# Patient Record
Sex: FEMALE | Race: WHITE
[De-identification: names, ages, dates, MRNs, and addresses within clinical notes are randomized per-mention and may not be internally consistent; named-entity substitution may affect disease eponyms.]

---

## 2019-11-25 ENCOUNTER — RX ONLY (RX ONLY)
Age: 49
End: 2019-11-25

## 2019-11-25 ENCOUNTER — DOCTOR'S OFFICE (OUTPATIENT)
Dept: URBAN - METROPOLITAN AREA CLINIC 136 | Facility: CLINIC | Age: 49
Setting detail: OPHTHALMOLOGY
End: 2019-11-25
Payer: MEDICAID

## 2019-11-25 DIAGNOSIS — H02.64: ICD-10-CM

## 2019-11-25 DIAGNOSIS — H02.62: ICD-10-CM

## 2019-11-25 DIAGNOSIS — H02.834: ICD-10-CM

## 2019-11-25 DIAGNOSIS — H02.65: ICD-10-CM

## 2019-11-25 DIAGNOSIS — H02.61: ICD-10-CM

## 2019-11-25 DIAGNOSIS — H02.831: ICD-10-CM

## 2019-11-25 DIAGNOSIS — H25.13: ICD-10-CM

## 2019-11-25 DIAGNOSIS — H02.433: ICD-10-CM

## 2019-11-25 PROCEDURE — 92285 EXTERNAL OCULAR PHOTOGRAPHY: CPT | Performed by: OPHTHALMOLOGY

## 2019-11-25 PROCEDURE — 99203 OFFICE O/P NEW LOW 30 MIN: CPT | Performed by: OPHTHALMOLOGY

## 2019-11-25 ASSESSMENT — LID POSITION - DERMATOCHALASIS
OS_DERMATOCHALASIS: LUL 2+
OD_DERMATOCHALASIS: RUL 1+

## 2019-11-25 ASSESSMENT — REFRACTION_CURRENTRX
OS_OVR_VA: 20/
OS_OVR_VA: 20/
OD_OVR_VA: 20/
OS_OVR_VA: 20/

## 2019-11-25 ASSESSMENT — SPHEQUIV_DERIVED
OD_SPHEQUIV: 0.875
OS_SPHEQUIV: 0.375

## 2019-11-25 ASSESSMENT — REFRACTION_MANIFEST
OS_VA3: 20/
OD_VA3: 20/
OS_VA1: 20/
OU_VA: 20/
OD_VA1: 20/
OD_VA2: 20/
OD_VA1: 20/
OS_VA2: 20/
OU_VA: 20/
OS_VA3: 20/
OS_VA1: 20/
OS_VA2: 20/
OD_VA2: 20/
OD_VA3: 20/

## 2019-11-25 ASSESSMENT — REFRACTION_AUTOREFRACTION
OS_CYLINDER: -1.75
OS_SPHERE: +1.25
OD_AXIS: 024
OD_SPHERE: +1.50
OS_AXIS: 163
OD_CYLINDER: -1.25

## 2019-11-25 ASSESSMENT — LID POSITION - PTOSIS
OS_PTOSIS: LUL T
OD_PTOSIS: RUL T

## 2019-11-25 ASSESSMENT — CONFRONTATIONAL VISUAL FIELD TEST (CVF)
OS_FINDINGS: FULL
OD_FINDINGS: FULL

## 2019-11-25 ASSESSMENT — VISUAL ACUITY
OD_BCVA: 20/30-2
OS_BCVA: 20/25

## 2020-01-01 ENCOUNTER — HOSPITAL ENCOUNTER (EMERGENCY)
Facility: HOSPITAL | Age: 50
Discharge: HOME | End: 2020-01-01
Attending: EMERGENCY MEDICINE | Admitting: EMERGENCY MEDICINE
Payer: MEDICARE

## 2020-01-01 ENCOUNTER — APPOINTMENT (EMERGENCY)
Dept: RADIOLOGY | Facility: HOSPITAL | Age: 50
End: 2020-01-01
Payer: MEDICARE

## 2020-01-01 VITALS
WEIGHT: 207 LBS | HEART RATE: 97 BPM | OXYGEN SATURATION: 95 % | SYSTOLIC BLOOD PRESSURE: 137 MMHG | DIASTOLIC BLOOD PRESSURE: 98 MMHG | RESPIRATION RATE: 18 BRPM | TEMPERATURE: 97.8 F

## 2020-01-01 DIAGNOSIS — J10.1 INFLUENZA B: Primary | ICD-10-CM

## 2020-01-01 LAB
ALBUMIN SERPL BCP-MCNC: 4.5 G/DL (ref 3–5.2)
ALP SERPL-CCNC: 81 U/L (ref 43–122)
ALT SERPL W P-5'-P-CCNC: 70 U/L (ref 9–52)
ANION GAP SERPL CALCULATED.3IONS-SCNC: 13 MMOL/L (ref 5–14)
AST SERPL W P-5'-P-CCNC: 37 U/L (ref 14–36)
BASOPHILS # BLD AUTO: 0.1 THOUSANDS/ΜL (ref 0–0.1)
BASOPHILS NFR BLD AUTO: 1 % (ref 0–1)
BILIRUB SERPL-MCNC: 0.5 MG/DL
BUN SERPL-MCNC: 16 MG/DL (ref 5–25)
BURR CELLS BLD QL SMEAR: PRESENT
CALCIUM SERPL-MCNC: 9.6 MG/DL (ref 8.4–10.2)
CHLORIDE SERPL-SCNC: 103 MMOL/L (ref 97–108)
CO2 SERPL-SCNC: 25 MMOL/L (ref 22–30)
CREAT SERPL-MCNC: 1.08 MG/DL (ref 0.6–1.2)
EOSINOPHIL # BLD AUTO: 0.1 THOUSAND/ΜL (ref 0–0.4)
EOSINOPHIL NFR BLD AUTO: 1 % (ref 0–6)
ERYTHROCYTE [DISTWIDTH] IN BLOOD BY AUTOMATED COUNT: 20.9 %
EXT PREG TEST URINE: NEGATIVE
EXT. CONTROL ED NAV: NORMAL
FLUAV RNA NPH QL NAA+PROBE: ABNORMAL
FLUBV RNA NPH QL NAA+PROBE: DETECTED
GFR SERPL CREATININE-BSD FRML MDRD: 60 ML/MIN/1.73SQ M
GLUCOSE SERPL-MCNC: 243 MG/DL (ref 70–99)
HCT VFR BLD AUTO: 38.5 % (ref 36–46)
HGB BLD-MCNC: 12.8 G/DL (ref 12–16)
LG PLATELETS BLD QL SMEAR: PRESENT
LIPASE SERPL-CCNC: 56 U/L (ref 23–300)
LYMPHOCYTES # BLD AUTO: 2.8 THOUSANDS/ΜL (ref 0.5–4)
LYMPHOCYTES NFR BLD AUTO: 39 % (ref 25–45)
MCH RBC QN AUTO: 25.2 PG (ref 26–34)
MCHC RBC AUTO-ENTMCNC: 33.2 G/DL (ref 31–36)
MCV RBC AUTO: 76 FL (ref 80–100)
MICROCYTES BLD QL AUTO: PRESENT
MONOCYTES # BLD AUTO: 0.9 THOUSAND/ΜL (ref 0.2–0.9)
MONOCYTES NFR BLD AUTO: 12 % (ref 1–10)
NEUTROPHILS # BLD AUTO: 3.4 THOUSANDS/ΜL (ref 1.8–7.8)
NEUTS SEG NFR BLD AUTO: 47 % (ref 45–65)
PLATELET # BLD AUTO: 187 THOUSANDS/UL (ref 150–450)
PLATELET BLD QL SMEAR: ADEQUATE
PMV BLD AUTO: 10.1 FL (ref 8.9–12.7)
POTASSIUM SERPL-SCNC: 4.4 MMOL/L (ref 3.6–5)
PROT SERPL-MCNC: 7.9 G/DL (ref 5.9–8.4)
RBC # BLD AUTO: 5.07 MILLION/UL (ref 4–5.2)
RBC MORPH BLD: PRESENT
RSV RNA NPH QL NAA+PROBE: ABNORMAL
SODIUM SERPL-SCNC: 141 MMOL/L (ref 137–147)
WBC # BLD AUTO: 7.2 THOUSAND/UL (ref 4.5–11)

## 2020-01-01 PROCEDURE — 87631 RESP VIRUS 3-5 TARGETS: CPT | Performed by: EMERGENCY MEDICINE

## 2020-01-01 PROCEDURE — 83690 ASSAY OF LIPASE: CPT | Performed by: EMERGENCY MEDICINE

## 2020-01-01 PROCEDURE — 94640 AIRWAY INHALATION TREATMENT: CPT

## 2020-01-01 PROCEDURE — 81025 URINE PREGNANCY TEST: CPT | Performed by: EMERGENCY MEDICINE

## 2020-01-01 PROCEDURE — 99284 EMERGENCY DEPT VISIT MOD MDM: CPT

## 2020-01-01 PROCEDURE — 85025 COMPLETE CBC W/AUTO DIFF WBC: CPT | Performed by: EMERGENCY MEDICINE

## 2020-01-01 PROCEDURE — 96360 HYDRATION IV INFUSION INIT: CPT

## 2020-01-01 PROCEDURE — 36415 COLL VENOUS BLD VENIPUNCTURE: CPT | Performed by: EMERGENCY MEDICINE

## 2020-01-01 PROCEDURE — 99284 EMERGENCY DEPT VISIT MOD MDM: CPT | Performed by: EMERGENCY MEDICINE

## 2020-01-01 PROCEDURE — 71046 X-RAY EXAM CHEST 2 VIEWS: CPT

## 2020-01-01 PROCEDURE — 80053 COMPREHEN METABOLIC PANEL: CPT | Performed by: EMERGENCY MEDICINE

## 2020-01-01 RX ORDER — PANTOPRAZOLE SODIUM 40 MG/1
40 TABLET, DELAYED RELEASE ORAL
COMMUNITY
Start: 2019-02-15

## 2020-01-01 RX ORDER — IPRATROPIUM BROMIDE AND ALBUTEROL SULFATE 2.5; .5 MG/3ML; MG/3ML
3 SOLUTION RESPIRATORY (INHALATION)
Status: DISCONTINUED | OUTPATIENT
Start: 2020-01-01 | End: 2020-01-01 | Stop reason: HOSPADM

## 2020-01-01 RX ORDER — LISINOPRIL 5 MG/1
5 TABLET ORAL DAILY
COMMUNITY
Start: 2019-10-07

## 2020-01-01 RX ORDER — ALOGLIPTIN 25 MG/1
25 TABLET, FILM COATED ORAL DAILY
COMMUNITY
Start: 2019-10-07

## 2020-01-01 RX ORDER — ONDANSETRON 4 MG/1
4 TABLET, FILM COATED ORAL EVERY 6 HOURS
Qty: 12 TABLET | Refills: 0 | Status: SHIPPED | OUTPATIENT
Start: 2020-01-01

## 2020-01-01 RX ORDER — ATORVASTATIN CALCIUM 80 MG/1
80 TABLET, FILM COATED ORAL DAILY
COMMUNITY
Start: 2019-06-05

## 2020-01-01 RX ORDER — UREA 10 %
400 LOTION (ML) TOPICAL DAILY
COMMUNITY

## 2020-01-01 RX ORDER — CARVEDILOL 25 MG/1
25 TABLET ORAL
COMMUNITY
Start: 2019-06-05

## 2020-01-01 RX ORDER — OSELTAMIVIR PHOSPHATE 75 MG/1
75 CAPSULE ORAL 2 TIMES DAILY
Qty: 10 CAPSULE | Refills: 0 | Status: SHIPPED | OUTPATIENT
Start: 2020-01-01 | End: 2020-01-01 | Stop reason: SDUPTHER

## 2020-01-01 RX ORDER — GLIMEPIRIDE 1 MG/1
1 TABLET ORAL
COMMUNITY
Start: 2019-09-25

## 2020-01-01 RX ORDER — ONDANSETRON 4 MG/1
4 TABLET, FILM COATED ORAL EVERY 6 HOURS
Qty: 12 TABLET | Refills: 0 | Status: SHIPPED | OUTPATIENT
Start: 2020-01-01 | End: 2020-01-01 | Stop reason: SDUPTHER

## 2020-01-01 RX ORDER — ONDANSETRON 2 MG/ML
4 INJECTION INTRAMUSCULAR; INTRAVENOUS ONCE
Status: DISCONTINUED | OUTPATIENT
Start: 2020-01-01 | End: 2020-01-01

## 2020-01-01 RX ORDER — OSELTAMIVIR PHOSPHATE 75 MG/1
75 CAPSULE ORAL 2 TIMES DAILY
Qty: 10 CAPSULE | Refills: 0 | Status: SHIPPED | OUTPATIENT
Start: 2020-01-01 | End: 2020-01-06

## 2020-01-01 RX ORDER — EZETIMIBE 10 MG/1
10 TABLET ORAL DAILY
COMMUNITY
Start: 2019-12-16 | End: 2020-12-15

## 2020-01-01 RX ADMIN — Medication 10 MG: at 17:14

## 2020-01-01 RX ADMIN — SODIUM CHLORIDE 1000 ML: 0.9 INJECTION, SOLUTION INTRAVENOUS at 17:28

## 2020-01-01 RX ADMIN — IPRATROPIUM BROMIDE AND ALBUTEROL SULFATE 3 ML: 2.5; .5 SOLUTION RESPIRATORY (INHALATION) at 17:13

## 2020-01-01 NOTE — ED PROVIDER NOTES
History  Chief Complaint   Patient presents with    Abdominal Pain     Patient reports left sided abdominal pain and cough for 3 days  denies n/v/d  Patient is a 13-year-old female history of hypertension hyperlipidemia presents for concerns of left-sided rib pain associated with cough that started after 4 days of coughing  Denies history of DVT PE or any type of coagulopathy  Denies any recent travel surgery  States he has some vague fevers and chills as well as overall muscle soreness and fatigue but no associated nausea vomiting diarrhea dysuria frequency  Denies any significant production to her cough  Denies any sick contacts or recent travel outside [place]  Prior to Admission Medications   Prescriptions Last Dose Informant Patient Reported? Taking? Alogliptin Benzoate 25 MG TABS   Yes Yes   Sig: Take 25 mg by mouth daily   atorvastatin (LIPITOR) 80 mg tablet   Yes Yes   Sig: Take 80 mg by mouth daily   carvedilol (COREG) 25 mg tablet   Yes Yes   Sig: Take 25 mg by mouth   ezetimibe (ZETIA) 10 mg tablet   Yes Yes   Sig: Take 10 mg by mouth daily   folic acid (FOLVITE) 611 MCG tablet   Yes No   Sig: Take 400 mcg by mouth daily   glimepiride (AMARYL) 1 mg tablet   Yes Yes   Sig: Take 1 mg by mouth   lisinopril (ZESTRIL) 5 mg tablet   Yes Yes   Sig: Take 5 mg by mouth daily   metFORMIN (GLUCOPHAGE) 500 mg tablet   Yes Yes   Sig: Take 500 mg by mouth   pantoprazole (PROTONIX) 40 mg tablet   Yes Yes   Sig: Take 40 mg by mouth      Facility-Administered Medications: None       Past Medical History:   Diagnosis Date    Diabetes mellitus ([facility] )     Hyperlipidemia     Hypertension     MI (myocardial infarction) ([facility] )     Stroke ([place] [address] )        History reviewed  No pertinent surgical history  History reviewed  No pertinent family history  I have reviewed and agree with the history as documented      Social History     Tobacco Use    Smoking status: Never Smoker    Smokeless tobacco: Never Used   Substance Use Topics    Alcohol use: Not Currently     Frequency: Never    Drug use: Yes     Types: Marijuana     Comment: daily         Review of Systems   Constitutional: Positive for chills and fever  HENT: Negative  Negative for rhinorrhea, sore throat, trouble swallowing and voice change  Eyes: Negative  Negative for pain and visual disturbance  Respiratory: Positive for cough  Negative for shortness of breath and wheezing  Cardiovascular: Negative  Negative for chest pain and palpitations  Gastrointestinal: Negative for abdominal pain, diarrhea, nausea and vomiting  Genitourinary: Negative  Negative for dysuria and frequency  Musculoskeletal: Positive for myalgias  Negative for neck pain and neck stiffness  Skin: Negative  Negative for rash  Neurological: Negative  Negative for dizziness, speech difficulty, weakness, light-headedness and numbness  Physical Exam  Physical Exam   Constitutional: She is oriented to person, place, and time  She appears well-developed and well-nourished  No distress  HENT:   Head: Normocephalic and atraumatic  Mouth/Throat: Oropharynx is clear and moist    Eyes: Pupils are equal, round, and reactive to light  Conjunctivae and EOM are normal    Neck: Normal range of motion  Neck supple  No tracheal deviation present  Cardiovascular: Normal rate, regular rhythm and intact distal pulses  Pulmonary/Chest: Effort normal and breath sounds normal  No respiratory distress  She has no wheezes  She has no rales  Abdominal: Soft  Bowel sounds are normal  She exhibits no distension  There is no tenderness  There is no rebound and no guarding  Musculoskeletal: Normal range of motion  She exhibits no tenderness or deformity  Neurological: She is alert and oriented to person, place, and time  Skin: Skin is warm and dry  Capillary refill takes less than 2 seconds  No rash noted  Psychiatric: She has a normal mood and affect   Her behavior is normal    Nursing note and vitals reviewed        Vital Signs  ED Triage Vitals   Temperature Pulse Respirations Blood Pressure SpO2   01/01/20 1652 01/01/20 1652 01/01/20 1655 01/01/20 1652 01/01/20 1652   97 8 °F (36 6 °C) 97 18 137/98 95 %      Temp Source Heart Rate Source Patient Position - Orthostatic VS BP Location FiO2 (%)   01/01/20 1652 01/01/20 1652 01/01/20 1652 01/01/20 1652 --   Tympanic Monitor Sitting Left arm       Pain Score       --                  Vitals:    01/01/20 1652   BP: 137/98   Pulse: 97   Patient Position - Orthostatic VS: Sitting         Visual Acuity      ED Medications  Medications   ipratropium-albuterol (DUO-NEB) 0 5-2 5 mg/3 mL inhalation solution 3 mL (3 mL Nebulization Given 1/1/20 1713)   sodium chloride 0 9 % bolus 1,000 mL (1,000 mL Intravenous New Bag 1/1/20 1728)   dexamethasone 10 mg/mL oral liquid 10 mg 1 mL (10 mg Oral Given 1/1/20 1714)       Diagnostic Studies  Results Reviewed     Procedure Component Value Units Date/Time    Influenza A/B and RSV PCR [[id number]]  (Abnormal) Collected:  01/01/20 1723    Lab Status:  Final result Specimen:  Nares from Nasopharyngeal Swab Updated:  01/01/20 1803     INFLUENZA A PCR None Detected     INFLUENZA B PCR Detected     RSV PCR None Detected    Lipase [[id number]]  (Normal) Collected:  01/01/20 1722    Lab Status:  Final result Specimen:  Blood from Arm, Left Updated:  01/01/20 1740     Lipase 56 u/L     Comprehensive metabolic panel [[id number]]  (Abnormal) Collected:  01/01/20 1722    Lab Status:  Final result Specimen:  Blood from Arm, Left Updated:  01/01/20 1740     Sodium 141 mmol/L      Potassium 4 4 mmol/L      Chloride 103 mmol/L      CO2 25 mmol/L      ANION GAP 13 mmol/L      BUN 16 mg/dL      Creatinine 1 08 mg/dL      Glucose 243 mg/dL      Calcium 9 6 mg/dL      AST 37 U/L      ALT 70 U/L      Alkaline Phosphatase 81 U/L      Total Protein 7 9 g/dL      Albumin 4 5 g/dL      Total Bilirubin 0 50 mg/dL eGFR 60 ml/min/1 73sq m     Narrative:       Guillermo guidelines for Chronic Kidney Disease (CKD):     Stage 1 with normal or high GFR (GFR > 90 mL/min/1 73 square meters)    Stage 2 Mild CKD (GFR = 60-89 mL/min/1 73 square meters)    Stage 3A Moderate CKD (GFR = 45-59 mL/min/1 73 square meters)    Stage 3B Moderate CKD (GFR = 30-44 mL/min/1 73 square meters)    Stage 4 Severe CKD (GFR = 15-29 mL/min/1 73 square meters)    Stage 5 End Stage CKD (GFR <15 mL/min/1 73 square meters)  Note: GFR calculation is accurate only with a steady state creatinine    CBC and differential [[id number]]  (Abnormal) Collected:  01/01/20 1723    Lab Status:  Final result Specimen:  Blood from Arm, Left Updated:  01/01/20 1733     WBC 7 20 Thousand/uL      RBC 5 07 Million/uL      Hemoglobin 12 8 g/dL      Hematocrit 38 5 %      MCV 76 fL      MCH 25 2 pg      MCHC 33 2 g/dL      RDW 20 9 %      MPV 10 1 fL      Platelets 564 Thousands/uL      Neutrophils Relative 47 %      Lymphocytes Relative 39 %      Monocytes Relative 12 %      Eosinophils Relative 1 %      Basophils Relative 1 %      Neutrophils Absolute 3 40 Thousands/µL      Lymphocytes Absolute 2 80 Thousands/µL      Monocytes Absolute 0 90 Thousand/µL      Eosinophils Absolute 0 10 Thousand/µL      Basophils Absolute 0 10 Thousands/µL     POCT pregnancy, urine [[id number]]  (Normal) Resulted:  01/01/20 1723    Lab Status:  Final result Updated:  01/01/20 1723     EXT PREG TEST UR (Ref: Negative) negative     Control valid                 XR chest 2 views   ED Interpretation by Jose David Espinoza DO (01/01 1803)   No acute pna or ptx appreciated  Procedures  Procedures         ED Course                               MDM  Number of Diagnoses or Management Options  Influenza B:   Diagnosis management comments: Patient is a 68-year-old female who presented for history exam as above    She is ambulating without difficulty, found to be influenza positive  Chest x-ray negative for acute pathology  Afebrile here in the emergency room and tolerating p o  Challenges in the ER and prior to arrival   Feels better after symptomatic treatment  Will prescribe prescriptions for Tamiflu and Zofran should symptoms worsen  Strict return precautions were discussed  Patient agreeable to plan, no further questions or concerns regarding care in the emergency room today  Amount and/or Complexity of Data Reviewed  Clinical lab tests: ordered and reviewed  Tests in the radiology section of CPT®: reviewed and ordered  Tests in the medicine section of CPT®: ordered and reviewed          Disposition  Final diagnoses:   Influenza B     Time reflects when diagnosis was documented in both MDM as applicable and the Disposition within this note     Time User Action Codes Description Comment    1/1/2020  6:13 PM Anabella Yanes Add [J10 1] Influenza B       ED Disposition     ED Disposition Condition Date/Time Comment    Discharge Stable Wed Jan 1, 2020  6:13 PM Emily Collins discharge to home/self care  Follow-up Information     Follow up With Specialties Details Why Contact Info    Lewis Leon MD Family Medicine   [address] [place] [address],[address]            Current Discharge Medication List      START taking these medications    Details   ondansetron (ZOFRAN) 4 mg tablet Take 1 tablet (4 mg total) by mouth every 6 (six) hours  Qty: 12 tablet, Refills: 0    Associated Diagnoses: Influenza B      oseltamivir (TAMIFLU) 75 mg capsule Take 1 capsule (75 mg total) by mouth 2 (two) times a day for 5 days  Qty: 10 capsule, Refills: 0    Associated Diagnoses: Influenza B         CONTINUE these medications which have NOT CHANGED    Details   Alogliptin Benzoate 25 MG TABS Take 25 mg by mouth daily      atorvastatin (LIPITOR) 80 mg tablet Take 80 mg by mouth daily      carvedilol (COREG) 25 mg tablet Take 25 mg by mouth ezetimibe (ZETIA) 10 mg tablet Take 10 mg by mouth daily      glimepiride (AMARYL) 1 mg tablet Take 1 mg by mouth      lisinopril (ZESTRIL) 5 mg tablet Take 5 mg by mouth daily      metFORMIN (GLUCOPHAGE) 500 mg tablet Take 500 mg by mouth      pantoprazole (PROTONIX) 40 mg tablet Take 40 mg by mouth      folic acid (FOLVITE) 228 MCG tablet Take 400 mcg by mouth daily           No discharge procedures on file      ED Provider  Electronically Signed by           Jose David Espinoza DO  01/01/20 9509

## 2023-01-23 ENCOUNTER — HOSPITAL ENCOUNTER (EMERGENCY)
Facility: HOSPITAL | Age: 53
Discharge: HOME | End: 2023-01-23
Attending: INTERNAL MEDICINE | Admitting: INTERNAL MEDICINE

## 2023-01-23 VITALS
RESPIRATION RATE: 18 BRPM | DIASTOLIC BLOOD PRESSURE: 85 MMHG | SYSTOLIC BLOOD PRESSURE: 136 MMHG | HEART RATE: 88 BPM | WEIGHT: 186.29 LBS | OXYGEN SATURATION: 96 % | TEMPERATURE: 98.7 F

## 2023-01-23 DIAGNOSIS — S61.213A LACERATION OF LEFT MIDDLE FINGER: Primary | ICD-10-CM

## 2023-01-23 DIAGNOSIS — Z79.01 ANTICOAGULATED: ICD-10-CM

## 2023-01-24 NOTE — ED PROVIDER NOTES
History  Chief Complaint   Patient presents with   • Finger Laceration     Pt reports that she was cutting a roast at home and cut her left 3rd finger  "I wouldn't be here except I'm on eliquis and baby aspirin, and this thing was bleeding!"     HPI  53-year-old female on Eliquis and aspirin presents ED for evaluation of laceration to the left third digit  Patient reports she cut her finger when she was cutting meat at home  She reports she had bleeding then she held pressure which helped  She reports pain is well controlled without medicines  She denies any other injuries  She has no other complaints or concerns  Prior to Admission Medications   Prescriptions Last Dose Informant Patient Reported? Taking? Alogliptin Benzoate 25 MG TABS   Yes No   Sig: Take 25 mg by mouth daily   atorvastatin (LIPITOR) 80 mg tablet   Yes No   Sig: Take 80 mg by mouth daily   carvedilol (COREG) 25 mg tablet   Yes No   Sig: Take 25 mg by mouth   ezetimibe (ZETIA) 10 mg tablet   Yes No   Sig: Take 10 mg by mouth daily   folic acid (FOLVITE) 996 MCG tablet   Yes No   Sig: Take 400 mcg by mouth daily   glimepiride (AMARYL) 1 mg tablet   Yes No   Sig: Take 1 mg by mouth   lisinopril (ZESTRIL) 5 mg tablet   Yes No   Sig: Take 5 mg by mouth daily   metFORMIN (GLUCOPHAGE) 500 mg tablet   Yes No   Sig: Take 500 mg by mouth   ondansetron (ZOFRAN) 4 mg tablet   No No   Sig: Take 1 tablet (4 mg total) by mouth every 6 (six) hours   pantoprazole (PROTONIX) 40 mg tablet   Yes No   Sig: Take 40 mg by mouth      Facility-Administered Medications: None       Past Medical History:   Diagnosis Date   • Diabetes mellitus (HCC)    • Hyperlipidemia    • Hypertension    • MI (myocardial infarction) ([facility] [address] [facility] )    • Stroke [facility])        History reviewed  No pertinent surgical history  History reviewed  No pertinent family history  I have reviewed and agree with the history as documented      E-Cigarette/Vaping     E-Cigarette/Vaping Substances Social History     Tobacco Use   • Smoking status: Never   • Smokeless tobacco: Never   Substance Use Topics   • Alcohol use: Not Currently   • Drug use: Yes     Types: Marijuana     Comment: daily        Review of Systems   All other systems reviewed and are negative  Physical Exam  Physical Exam   PHYSICAL EXAM    Constitutional:  Well developed, no acute distress  HEENT:  Conjunctiva normal  Oropharynx moist  Respiratory:  No respiratory distress  Cardiovascular:  Normal rate  GI:  Soft, nondistended, nontender  :  No costovertebral angle tenderness   Musculoskeletal: left third digit with superficial laceration with minimum oozing  Integument:  Well hydrated, no rash   Lymphatic:  No lymphadenopathy noted   Neurologic:  Alert & oriented x 3, normal motor function, no focal deficits noted   Psychiatric:  Speech and behavior appropriate       Vital Signs  ED Triage Vitals [01/23/23 2122]   Temperature Pulse Respirations Blood Pressure SpO2   98 7 °F (37 1 °C) 88 18 136/85 96 %      Temp Source Heart Rate Source Patient Position - Orthostatic VS BP Location FiO2 (%)   Oral Monitor Sitting Left arm --      Pain Score       --           Vitals:    01/23/23 2122   BP: 136/85   Pulse: 88   Patient Position - Orthostatic VS: Sitting         Visual Acuity      ED Medications  Medications - No data to display    Diagnostic Studies  Results Reviewed     None                 No orders to display              Procedures  Procedures         ED Course                                             Medical Decision Making  Wound dressed with Surgicel, Surgifoam and circumferential gauze dressing  Bleeding well controlled  Patient instructed to leave dressing on for the next 24 to 48 hours  If the bleeding soaked through the dressing, she is instructed to return to the ER      Anticoagulated: chronic illness or injury  Laceration of left middle finger: acute illness or injury      Disposition  Final diagnoses: Laceration of left middle finger   Anticoagulated     Time reflects when diagnosis was documented in both MDM as applicable and the Disposition within this note     Time User Action Codes Description Comment    1/23/2023  9:50 PM Isai Ledezma Add [L54 825W] Laceration of left middle finger     1/23/2023  9:50 PM Isai Ledezma Add [Z79 01] Anticoagulated       ED Disposition     ED Disposition   Discharge    Condition   --    Date/Time   Mon Jan 23, 2023  9:50 PM    Comment   Ilsa Patient discharge to home/self care per Dr Kong Revels up With Specialties Details Why Contact Info    Kathi Valero MD Family Medicine   [address]            Discharge Medication List as of 1/23/2023  9:50 PM      CONTINUE these medications which have NOT CHANGED    Details   Alogliptin Benzoate 25 MG TABS Take 25 mg by mouth daily, Starting Mon 10/7/2019, Historical Med      atorvastatin (LIPITOR) 80 mg tablet Take 80 mg by mouth daily, Starting Wed 6/5/2019, Historical Med      carvedilol (COREG) 25 mg tablet Take 25 mg by mouth, Starting Wed 6/5/2019, Historical Med      ezetimibe (ZETIA) 10 mg tablet Take 10 mg by mouth daily, Starting Mon 12/16/2019, Until Tue 12/15/2020, Historical Med      folic acid (FOLVITE) 571 MCG tablet Take 400 mcg by mouth daily, Historical Med      glimepiride (AMARYL) 1 mg tablet Take 1 mg by mouth, Starting Wed 9/25/2019, Historical Med      lisinopril (ZESTRIL) 5 mg tablet Take 5 mg by mouth daily, Starting Mon 10/7/2019, Historical Med      metFORMIN (GLUCOPHAGE) 500 mg tablet Take 500 mg by mouth, Starting Wed 6/5/2019, Until Thu 6/4/2020, Historical Med      ondansetron (ZOFRAN) 4 mg tablet Take 1 tablet (4 mg total) by mouth every 6 (six) hours, Starting Wed 1/1/2020, Print      pantoprazole (PROTONIX) 40 mg tablet Take 40 mg by mouth, Starting Fri 2/15/2019, Historical Med             No discharge procedures on file      PDMP Review     None          ED Provider  Electronically Signed by           Rosemary Villasenor MD  01/23/23 5252